# Patient Record
Sex: MALE | Race: OTHER | URBAN - METROPOLITAN AREA
[De-identification: names, ages, dates, MRNs, and addresses within clinical notes are randomized per-mention and may not be internally consistent; named-entity substitution may affect disease eponyms.]

---

## 2018-09-25 NOTE — PATIENT DISCUSSION
""""" ""Informed patient that their cataract(s) are not visually significant or do not meet the criteria for cataract surgery.  Recommended attention to common cataract symptoms

## 2020-12-30 NOTE — PATIENT DISCUSSION
"""Follow drusen without treatment. Call if vision or distortion increases.  Recommend regular amsler checks."" ""Start AREDS 2 by mouth ."""

## 2022-01-26 NOTE — PATIENT DISCUSSION
"""Follow drusen without treatment. Call if vision or distortion increases.  Recommend regular amsler checks."" ""Start AREDS 2 by mouth .""."

## 2023-04-24 ENCOUNTER — PREPPED CHART (OUTPATIENT)
Dept: URBAN - METROPOLITAN AREA CLINIC 50 | Facility: CLINIC | Age: 68
End: 2023-04-24

## 2023-05-22 ENCOUNTER — NEW PATIENT (OUTPATIENT)
Dept: URBAN - METROPOLITAN AREA CLINIC 50 | Facility: CLINIC | Age: 68
End: 2023-05-22

## 2023-05-22 DIAGNOSIS — H25.811: ICD-10-CM

## 2023-05-22 DIAGNOSIS — E11.9: ICD-10-CM

## 2023-05-22 DIAGNOSIS — H25.12: ICD-10-CM

## 2023-05-22 PROCEDURE — 92134 CPTRZ OPH DX IMG PST SGM RTA: CPT

## 2023-05-22 PROCEDURE — 92004 COMPRE OPH EXAM NEW PT 1/>: CPT

## 2023-05-22 ASSESSMENT — TONOMETRY
OD_IOP_MMHG: 14
OS_IOP_MMHG: 15

## 2023-05-22 ASSESSMENT — VISUAL ACUITY
OD_PH: 20/40
OD_CC: 20/50
OD_SC: 20/200
OU_SC: 20/400@16"
OD_GLARE: >20/400
OU_CC: J3@16"
OU_SC: 20/200
OU_CC: 20/50

## 2023-05-23 ENCOUNTER — DIAGNOSTICS ONLY (OUTPATIENT)
Dept: URBAN - METROPOLITAN AREA CLINIC 50 | Facility: CLINIC | Age: 68
End: 2023-05-23

## 2023-05-23 DIAGNOSIS — H25.12: ICD-10-CM

## 2023-05-23 PROCEDURE — 76512 OPH US DX B-SCAN: CPT

## 2023-06-12 ENCOUNTER — PRE-OP/H&P (OUTPATIENT)
Dept: URBAN - METROPOLITAN AREA CLINIC 50 | Facility: CLINIC | Age: 68
End: 2023-06-12

## 2023-06-12 DIAGNOSIS — H25.12: ICD-10-CM

## 2023-06-12 PROCEDURE — 92136 OPHTHALMIC BIOMETRY: CPT

## 2023-06-12 PROCEDURE — PREOP PRE OP VISIT

## 2023-06-12 RX ORDER — PREDNISOLONE ACETATE 10 MG/ML: 1 SUSPENSION/ DROPS OPHTHALMIC

## 2023-06-12 RX ORDER — MOXIFLOXACIN OPHTHALMIC 5 MG/ML: 1 SOLUTION/ DROPS OPHTHALMIC

## 2023-06-12 RX ORDER — KETOROLAC TROMETHAMINE 4 MG/ML: 1 SOLUTION/ DROPS OPHTHALMIC

## 2023-06-12 ASSESSMENT — KERATOMETRY
OD_AXISANGLE_DEGREES: 45
OD_K1POWER_DIOPTERS: 45.37
OD_AXISANGLE2_DEGREES: 135
OD_K2POWER_DIOPTERS: 44.87
OS_AXISANGLE2_DEGREES: 110
OS_K1POWER_DIOPTERS: 45.25
OS_AXISANGLE_DEGREES: 20
OS_K2POWER_DIOPTERS: 44.25

## 2023-06-12 ASSESSMENT — TONOMETRY
OS_IOP_MMHG: 16
OD_IOP_MMHG: 16

## 2023-06-12 ASSESSMENT — VISUAL ACUITY
OD_PH: 20/40
OD_CC: 20/50-1

## 2023-06-22 ENCOUNTER — POST-OP (OUTPATIENT)
Dept: URBAN - METROPOLITAN AREA CLINIC 48 | Facility: LOCATION | Age: 68
End: 2023-06-22

## 2023-06-22 ENCOUNTER — SURGERY/PROCEDURE (OUTPATIENT)
Dept: URBAN - METROPOLITAN AREA SURGERY 16 | Facility: SURGERY | Age: 68
End: 2023-06-22

## 2023-06-22 DIAGNOSIS — Z96.1: ICD-10-CM

## 2023-06-22 DIAGNOSIS — H25.89: ICD-10-CM

## 2023-06-22 DIAGNOSIS — Z98.42: ICD-10-CM

## 2023-06-22 PROCEDURE — 66982 XCAPSL CTRC RMVL CPLX WO ECP: CPT

## 2023-06-22 ASSESSMENT — KERATOMETRY
OD_AXISANGLE_DEGREES: 45
OD_K2POWER_DIOPTERS: 44.87
OS_K2POWER_DIOPTERS: 44.25
OS_AXISANGLE2_DEGREES: 110
OS_K2POWER_DIOPTERS: 44.25
OS_K1POWER_DIOPTERS: 45.25
OD_AXISANGLE2_DEGREES: 135
OS_AXISANGLE2_DEGREES: 110
OD_K1POWER_DIOPTERS: 45.37
OS_K1POWER_DIOPTERS: 45.25
OS_AXISANGLE_DEGREES: 20
OD_K2POWER_DIOPTERS: 44.87
OS_AXISANGLE_DEGREES: 20
OD_K1POWER_DIOPTERS: 45.37
OD_AXISANGLE_DEGREES: 45
OD_AXISANGLE2_DEGREES: 135

## 2023-06-22 ASSESSMENT — VISUAL ACUITY: OS_SC: 20/60

## 2023-06-22 ASSESSMENT — TONOMETRY: OS_IOP_MMHG: 05

## 2023-06-28 ENCOUNTER — POST OP/EVAL OF SECOND EYE (OUTPATIENT)
Dept: URBAN - METROPOLITAN AREA CLINIC 53 | Facility: CLINIC | Age: 68
End: 2023-06-28

## 2023-06-28 DIAGNOSIS — H25.811: ICD-10-CM

## 2023-06-28 DIAGNOSIS — Z98.42: ICD-10-CM

## 2023-06-28 DIAGNOSIS — Z96.1: ICD-10-CM

## 2023-06-28 PROCEDURE — 99213 OFFICE O/P EST LOW 20 MIN: CPT

## 2023-06-28 PROCEDURE — 92136 - 2N OPHTHALMIC BIOMETRY BY PARTIAL COHERENCE INTERFEROMETRY WITH INTRAOCULAR LENS POWER CALCULATION

## 2023-06-28 RX ORDER — PREDNISOLONE ACETATE 10 MG/ML: 1 SUSPENSION/ DROPS OPHTHALMIC

## 2023-06-28 RX ORDER — KETOROLAC TROMETHAMINE 5 MG/ML: 1 SOLUTION OPHTHALMIC TWICE A DAY

## 2023-06-28 RX ORDER — MOXIFLOXACIN OPHTHALMIC 5 MG/ML: 1 SOLUTION/ DROPS OPHTHALMIC

## 2023-06-28 RX ORDER — KETOROLAC TROMETHAMINE 5 MG/ML: 1 SOLUTION OPHTHALMIC

## 2023-06-28 ASSESSMENT — VISUAL ACUITY
OD_GLARE: 20/80
OS_SC: 20/30-2
OS_PH: 20/25
OD_GLARE: 20/150
OD_CC: 20/50
OD_PH: 20/40

## 2023-06-28 ASSESSMENT — KERATOMETRY
OD_K2POWER_DIOPTERS: 44.87
OS_AXISANGLE2_DEGREES: 110
OS_K2POWER_DIOPTERS: 44.25
OS_K2POWER_DIOPTERS: 45.25
OD_AXISANGLE_DEGREES: 165
OD_AXISANGLE_DEGREES: 45
OD_K1POWER_DIOPTERS: 45.00
OD_AXISANGLE2_DEGREES: 75
OS_AXISANGLE_DEGREES: 53
OS_K1POWER_DIOPTERS: 44.00
OD_K2POWER_DIOPTERS: 45.50
OS_K1POWER_DIOPTERS: 45.25
OD_K1POWER_DIOPTERS: 45.37
OD_AXISANGLE2_DEGREES: 135
OS_AXISANGLE2_DEGREES: 143
OS_AXISANGLE_DEGREES: 20

## 2023-06-28 ASSESSMENT — TONOMETRY
OS_IOP_MMHG: 16
OD_IOP_MMHG: 17

## 2023-07-06 ENCOUNTER — SURGERY/PROCEDURE (OUTPATIENT)
Dept: URBAN - METROPOLITAN AREA SURGERY 16 | Facility: SURGERY | Age: 68
End: 2023-07-06

## 2023-07-06 ENCOUNTER — POST-OP (OUTPATIENT)
Dept: URBAN - METROPOLITAN AREA CLINIC 48 | Facility: LOCATION | Age: 68
End: 2023-07-06

## 2023-07-06 DIAGNOSIS — Z98.41: ICD-10-CM

## 2023-07-06 DIAGNOSIS — H25.811: ICD-10-CM

## 2023-07-06 DIAGNOSIS — Z96.1: ICD-10-CM

## 2023-07-06 PROCEDURE — 66984 XCAPSL CTRC RMVL W/O ECP: CPT

## 2023-07-06 ASSESSMENT — KERATOMETRY
OD_K2POWER_DIOPTERS: 44.87
OD_AXISANGLE2_DEGREES: 135
OS_K2POWER_DIOPTERS: 44.25
OS_AXISANGLE2_DEGREES: 110
OD_AXISANGLE2_DEGREES: 75
OS_AXISANGLE_DEGREES: 20
OD_K1POWER_DIOPTERS: 45.00
OS_K1POWER_DIOPTERS: 44.00
OD_AXISANGLE2_DEGREES: 75
OS_AXISANGLE2_DEGREES: 143
OS_K2POWER_DIOPTERS: 45.25
OS_K2POWER_DIOPTERS: 44.25
OD_AXISANGLE_DEGREES: 45
OS_K1POWER_DIOPTERS: 44.00
OD_AXISANGLE2_DEGREES: 135
OS_AXISANGLE2_DEGREES: 143
OS_AXISANGLE_DEGREES: 20
OD_K2POWER_DIOPTERS: 45.50
OS_AXISANGLE_DEGREES: 53
OS_K1POWER_DIOPTERS: 45.25
OD_K1POWER_DIOPTERS: 45.00
OS_K1POWER_DIOPTERS: 45.25
OD_AXISANGLE_DEGREES: 165
OD_K1POWER_DIOPTERS: 45.37
OD_AXISANGLE_DEGREES: 45
OD_K1POWER_DIOPTERS: 45.37
OS_K2POWER_DIOPTERS: 45.25
OD_K2POWER_DIOPTERS: 45.50
OD_K2POWER_DIOPTERS: 44.87
OS_AXISANGLE2_DEGREES: 110
OS_AXISANGLE_DEGREES: 53
OD_AXISANGLE_DEGREES: 165

## 2023-07-06 ASSESSMENT — VISUAL ACUITY: OD_SC: 20/40-1

## 2023-07-06 ASSESSMENT — TONOMETRY: OD_IOP_MMHG: 11

## 2023-07-10 ENCOUNTER — POST-OP (OUTPATIENT)
Dept: URBAN - METROPOLITAN AREA CLINIC 50 | Facility: CLINIC | Age: 68
End: 2023-07-10

## 2023-07-10 DIAGNOSIS — Z98.41: ICD-10-CM

## 2023-07-10 DIAGNOSIS — Z96.1: ICD-10-CM

## 2023-07-10 PROCEDURE — 99024 POSTOP FOLLOW-UP VISIT: CPT

## 2023-07-10 ASSESSMENT — KERATOMETRY
OD_K2POWER_DIOPTERS: 45.50
OD_K2POWER_DIOPTERS: 44.87
OD_K1POWER_DIOPTERS: 45.37
OS_K2POWER_DIOPTERS: 44.25
OD_AXISANGLE2_DEGREES: 135
OD_AXISANGLE_DEGREES: 165
OS_K1POWER_DIOPTERS: 45.25
OS_AXISANGLE_DEGREES: 20
OS_AXISANGLE2_DEGREES: 110
OS_K2POWER_DIOPTERS: 45.25
OD_K1POWER_DIOPTERS: 45.00
OS_K1POWER_DIOPTERS: 44.00
OD_AXISANGLE_DEGREES: 45
OS_AXISANGLE_DEGREES: 53
OS_AXISANGLE2_DEGREES: 143
OD_AXISANGLE2_DEGREES: 75

## 2023-07-10 ASSESSMENT — VISUAL ACUITY: OD_SC: 20/20

## 2023-07-10 ASSESSMENT — TONOMETRY
OD_IOP_MMHG: 14
OS_IOP_MMHG: 15

## 2023-08-14 ENCOUNTER — POST-OP (OUTPATIENT)
Dept: URBAN - METROPOLITAN AREA CLINIC 50 | Facility: LOCATION | Age: 68
End: 2023-08-14

## 2023-08-14 DIAGNOSIS — Z98.41: ICD-10-CM

## 2023-08-14 DIAGNOSIS — H17.9: ICD-10-CM

## 2023-08-14 DIAGNOSIS — Z98.42: ICD-10-CM

## 2023-08-14 DIAGNOSIS — E11.9: ICD-10-CM

## 2023-08-14 PROCEDURE — 99024 POSTOP FOLLOW-UP VISIT: CPT

## 2023-08-14 PROCEDURE — 92015 DETERMINE REFRACTIVE STATE: CPT

## 2023-08-14 ASSESSMENT — VISUAL ACUITY
OU_CC: J2@14"
OS_PH: 20/25
OS_SC: 20/25-2
OU_SC: 20/25-1
OD_SC: 20/25-2

## 2023-08-14 ASSESSMENT — TONOMETRY
OD_IOP_MMHG: 14
OS_IOP_MMHG: 14